# Patient Record
Sex: MALE | Race: WHITE | NOT HISPANIC OR LATINO | Employment: FULL TIME | URBAN - METROPOLITAN AREA
[De-identification: names, ages, dates, MRNs, and addresses within clinical notes are randomized per-mention and may not be internally consistent; named-entity substitution may affect disease eponyms.]

---

## 2019-10-09 ENCOUNTER — HOSPITAL ENCOUNTER (EMERGENCY)
Facility: HOSPITAL | Age: 34
Discharge: HOME | End: 2019-10-09
Attending: EMERGENCY MEDICINE | Admitting: EMERGENCY MEDICINE

## 2019-10-09 ENCOUNTER — APPOINTMENT (OUTPATIENT)
Dept: GENERAL RADIOLOGY | Facility: HOSPITAL | Age: 34
End: 2019-10-09

## 2019-10-09 VITALS
HEART RATE: 72 BPM | HEIGHT: 71 IN | WEIGHT: 159 LBS | SYSTOLIC BLOOD PRESSURE: 114 MMHG | BODY MASS INDEX: 22.26 KG/M2 | TEMPERATURE: 97.9 F | RESPIRATION RATE: 20 BRPM | OXYGEN SATURATION: 90 % | DIASTOLIC BLOOD PRESSURE: 64 MMHG

## 2019-10-09 DIAGNOSIS — R06.00 DYSPNEA, UNSPECIFIED TYPE: ICD-10-CM

## 2019-10-09 DIAGNOSIS — J44.1 COPD EXACERBATION (HCC): Primary | ICD-10-CM

## 2019-10-09 PROCEDURE — 99284 EMERGENCY DEPT VISIT MOD MDM: CPT

## 2019-10-09 PROCEDURE — 71045 X-RAY EXAM CHEST 1 VIEW: CPT

## 2019-10-09 PROCEDURE — 63710000001 PREDNISONE PER 5 MG: Performed by: EMERGENCY MEDICINE

## 2019-10-09 PROCEDURE — 94640 AIRWAY INHALATION TREATMENT: CPT

## 2019-10-09 RX ORDER — ALBUTEROL SULFATE 1.25 MG/3ML
1 SOLUTION RESPIRATORY (INHALATION) EVERY 4 HOURS PRN
Qty: 20 VIAL | Refills: 0 | Status: SHIPPED | OUTPATIENT
Start: 2019-10-09

## 2019-10-09 RX ORDER — IPRATROPIUM BROMIDE AND ALBUTEROL SULFATE 2.5; .5 MG/3ML; MG/3ML
3 SOLUTION RESPIRATORY (INHALATION) ONCE
Status: COMPLETED | OUTPATIENT
Start: 2019-10-09 | End: 2019-10-09

## 2019-10-09 RX ORDER — DOXYCYCLINE 100 MG/1
100 CAPSULE ORAL 2 TIMES DAILY
Qty: 14 CAPSULE | Refills: 0 | Status: SHIPPED | OUTPATIENT
Start: 2019-10-09

## 2019-10-09 RX ORDER — PREDNISONE 50 MG/1
50 TABLET ORAL DAILY
Qty: 4 TABLET | Refills: 0 | Status: SHIPPED | OUTPATIENT
Start: 2019-10-09

## 2019-10-09 RX ADMIN — IPRATROPIUM BROMIDE AND ALBUTEROL SULFATE 3 ML: .5; 3 SOLUTION RESPIRATORY (INHALATION) at 20:42

## 2019-10-09 RX ADMIN — PREDNISONE 60 MG: 10 TABLET ORAL at 20:41

## 2019-10-10 NOTE — ED PROVIDER NOTES
"Subjective   History of Present Illness  Shortness of breath and cough  34-year-old male with history of COPD complains of cough and shortness of breath over the last few hours.  He states he had a nonproductive cough he denies any known ill contacts he reports no fever chills or chest pain.  He denies any known triggers.  Review of Systems   Constitutional: Negative.    HENT: Negative.    Respiratory: Positive for cough, shortness of breath and wheezing.    Cardiovascular: Negative.    Gastrointestinal: Negative.    Musculoskeletal: Negative.    Neurological: Negative.        Past Medical History:   Diagnosis Date   • COPD (chronic obstructive pulmonary disease) (CMS/[facility])      COPD  No Known Allergies    History reviewed. No pertinent surgical history.    History reviewed. No pertinent family history.    Social History     Socioeconomic History   • Marital status:      Spouse name: Not on file   • Number of children: Not on file   • Years of education: Not on file   • Highest education level: Not on file   Tobacco Use   • Smoking status: Current Every Day Smoker     Packs/day: 1.50     Types: Cigarettes   • Smokeless tobacco: Never Used   Substance and Sexual Activity   • Alcohol use: No     Frequency: Never   • Drug use: No   • Sexual activity: Yes     Chronic smoker      Objective   Physical Exam  /72   Pulse 84   Temp 98.1 °F (36.7 °C)   Resp 18   Ht 180.3 cm (71\")   Wt 72.1 kg (159 lb)   SpO2 91%   BMI 22.18 kg/m²   General: Well-developed well-appearing, no acute distress, alert and appropriate  Eyes: Pupils round and reactive, sclera nonicteric  HEENT: Mucous membranes moist, no mucosal swelling  Neck: Supple, no nuchal rigidity, no lymphadenopathy  Respirations: Expiratory wheeze, respirations nonlabored, occasional bronc spastic cough  Heart regular rate and rhythm, no murmurs rubs or gallops,   Abdomen soft nontender nondistended, no hepatosplenomegaly, no hernia, no mass, normal bowel " sounds, no CVA tenderness  Extremities no clubbing cyanosis or edema, calves are symmetric and nontender  Neuro cranial nerves grossly intact, no focal limb deficits  Psych oriented, pleasant affect  Skin no rash, brisk cap refill  Procedures           ED Course          Xr Chest 1 View    Result Date: 10/9/2019  1.  No evidence of active disease.   Electronically Signed By-Anca Eddy On:10/9/2019 8:43 PM This report was finalized on [id number] by  Anca Eddy, .            MDM  Patient was ordered duo nebs and steroid.  He is resting capillary examination his oxygen saturation were in the mid 90s and he is in no respiratory distress.  He does present with findings of COPD exacerbation with acute bronchitis.  He states he is out of his nebulizer treatments.  He was prescribed albuterol nebulizer solution as well as prednisone and doxycycline.  He was given warning signs for return and was advised on smoking cessation  Final diagnoses:   COPD exacerbation (CMS/[facility])   Dyspnea, unspecified type              Bill Díaz MD  10/09/19 0953

## 2022-06-19 ENCOUNTER — HOSPITAL ENCOUNTER (EMERGENCY)
Facility: HOSPITAL | Age: 37
Discharge: HOME | End: 2022-06-19
Attending: EMERGENCY MEDICINE | Admitting: EMERGENCY MEDICINE

## 2022-06-19 ENCOUNTER — APPOINTMENT (OUTPATIENT)
Dept: GENERAL RADIOLOGY | Facility: HOSPITAL | Age: 37
End: 2022-06-19

## 2022-06-19 VITALS
RESPIRATION RATE: 15 BRPM | BODY MASS INDEX: 21.45 KG/M2 | HEIGHT: 71 IN | DIASTOLIC BLOOD PRESSURE: 63 MMHG | WEIGHT: 153.22 LBS | TEMPERATURE: 97.8 F | SYSTOLIC BLOOD PRESSURE: 105 MMHG | HEART RATE: 42 BPM | OXYGEN SATURATION: 96 %

## 2022-06-19 DIAGNOSIS — K08.89 PAIN, DENTAL: ICD-10-CM

## 2022-06-19 DIAGNOSIS — J02.9 PHARYNGITIS, UNSPECIFIED ETIOLOGY: Primary | ICD-10-CM

## 2022-06-19 LAB
B PARAPERT DNA SPEC QL NAA+PROBE: NOT DETECTED
B PERT DNA SPEC QL NAA+PROBE: NOT DETECTED
C PNEUM DNA NPH QL NAA+NON-PROBE: NOT DETECTED
FLUAV SUBTYP SPEC NAA+PROBE: NOT DETECTED
FLUBV RNA ISLT QL NAA+PROBE: NOT DETECTED
HADV DNA SPEC NAA+PROBE: NOT DETECTED
HCOV 229E RNA SPEC QL NAA+PROBE: NOT DETECTED
HCOV HKU1 RNA SPEC QL NAA+PROBE: NOT DETECTED
HCOV NL63 RNA SPEC QL NAA+PROBE: NOT DETECTED
HCOV OC43 RNA SPEC QL NAA+PROBE: NOT DETECTED
HMPV RNA NPH QL NAA+NON-PROBE: NOT DETECTED
HPIV1 RNA ISLT QL NAA+PROBE: NOT DETECTED
HPIV2 RNA SPEC QL NAA+PROBE: NOT DETECTED
HPIV3 RNA NPH QL NAA+PROBE: NOT DETECTED
HPIV4 P GENE NPH QL NAA+PROBE: NOT DETECTED
M PNEUMO IGG SER IA-ACNC: NOT DETECTED
RHINOVIRUS RNA SPEC NAA+PROBE: NOT DETECTED
RSV RNA NPH QL NAA+NON-PROBE: NOT DETECTED
S PYO AG THROAT QL: NEGATIVE
SARS-COV-2 RNA NPH QL NAA+NON-PROBE: NOT DETECTED

## 2022-06-19 PROCEDURE — 0202U NFCT DS 22 TRGT SARS-COV-2: CPT | Performed by: EMERGENCY MEDICINE

## 2022-06-19 PROCEDURE — 70360 X-RAY EXAM OF NECK: CPT

## 2022-06-19 PROCEDURE — C9803 HOPD COVID-19 SPEC COLLECT: HCPCS | Performed by: EMERGENCY MEDICINE

## 2022-06-19 PROCEDURE — 87651 STREP A DNA AMP PROBE: CPT | Performed by: EMERGENCY MEDICINE

## 2022-06-19 PROCEDURE — 99283 EMERGENCY DEPT VISIT LOW MDM: CPT

## 2022-06-19 RX ORDER — CLINDAMYCIN HYDROCHLORIDE 300 MG/1
300 CAPSULE ORAL 4 TIMES DAILY
Qty: 28 CAPSULE | Refills: 0 | Status: SHIPPED | OUTPATIENT
Start: 2022-06-19

## 2022-06-19 RX ADMIN — ALUMINA, MAGNESIA, AND SIMETHICONE: 2400; 2400; 240 SUSPENSION ORAL at 06:07

## 2022-06-19 NOTE — ED PROVIDER NOTES
Subjective   Chief complaint: Patient is a pleasant 37-year-old male.  He came in because of pain when he swallows.  It hurts on both sides of his neck.  His glands feel swollen.  He thinks could be his teeth.  He has bad teeth since he used to be a drug abuser.  He is clean now.  He has pain on the left side of his jaw.  But that pain has been there.  Its not been significantly worsening.  His daughter has had strep throat 3 times in the last few weeks.  He has not had fever.  No problems with secretions.  No vomiting.  No cough.  No chest pain.  No abdominal pain.    Context: As above    Duration: 3 days    Timing: Persistent    Severity: Moderate    Associated Symptoms: As noted above        PCP:  LMP:          Review of Systems   Constitutional: Negative for fever.   HENT: Positive for dental problem and sore throat.    Respiratory: Negative.    Cardiovascular: Negative.    Gastrointestinal: Negative.        Past Medical History:   Diagnosis Date   • COPD (chronic obstructive pulmonary disease) (CMS/[facility])        Allergies   Allergen Reactions   • Codeine Itching       No past surgical history on file.    No family history on file.    Social History     Socioeconomic History   • Marital status:    Tobacco Use   • Smoking status: Current Every Day Smoker     Packs/day: 1.50     Types: Cigarettes   • Smokeless tobacco: Never Used   Substance and Sexual Activity   • Alcohol use: No   • Drug use: No   • Sexual activity: Yes           Objective   Physical Exam  Vitals and nursing note reviewed.   HENT:      Head: Normocephalic and atraumatic.      Mouth/Throat:      Mouth: Mucous membranes are moist.      Comments: Patient with poor diffuse dentition.  The left lower jawline is tender to palpate.  No abscess is noted.  There is no drooling.  There is no hot potato voice.  Patient is not having any problems with airway and has not tripoding.  Eyes:      Pupils: Pupils are equal, round, and reactive to light.    Neck:        Comments: Mild tenderness to palpation anterior cervical node chain  Cardiovascular:      Rate and Rhythm: Normal rate and regular rhythm.      Heart sounds: Normal heart sounds.   Pulmonary:      Effort: Pulmonary effort is normal.      Breath sounds: Normal breath sounds.   Musculoskeletal:         General: Normal range of motion.   Neurological:      General: No focal deficit present.      Mental Status: He is oriented to person, place, and time.   Psychiatric:         Mood and Affect: Mood normal.         Behavior: Behavior normal.         Thought Content: Thought content normal.         Judgment: Judgment normal.         Procedures           ED Course            Results for orders placed or performed during the hospital encounter of 06/19/22   Respiratory Panel PCR w/COVID-19(SARS-CoV-2) FATOU/ASCENCION/PANKAJ/PAD/COR/MAD/[facility] In-House, NP Swab in UTM/VTM, 3-4 HR TAT - Swab, Nasopharynx    Specimen: Nasopharynx; Swab   Result Value Ref Range    ADENOVIRUS, PCR Not Detected Not Detected    Coronavirus 229E Not Detected Not Detected    Coronavirus HKU1 Not Detected Not Detected    Coronavirus NL63 Not Detected Not Detected    Coronavirus OC43 Not Detected Not Detected    COVID19 Not Detected Not Detected - Ref. Range    Human Metapneumovirus Not Detected Not Detected    Human Rhinovirus/Enterovirus Not Detected Not Detected    Influenza A PCR Not Detected Not Detected    Influenza B PCR Not Detected Not Detected    Parainfluenza Virus 1 Not Detected Not Detected    Parainfluenza Virus 2 Not Detected Not Detected    Parainfluenza Virus 3 Not Detected Not Detected    Parainfluenza Virus 4 Not Detected Not Detected    RSV, PCR Not Detected Not Detected    Bordetella pertussis pcr Not Detected Not Detected    Bordetella parapertussis PCR Not Detected Not Detected    Chlamydophila pneumoniae PCR Not Detected Not Detected    Mycoplasma pneumo by PCR Not Detected Not Detected   Rapid Strep A Screen - Swab, Throat     Specimen: Throat; Swab   Result Value Ref Range    Strep A Ag Negative Negative     XR Neck Soft Tissue    Result Date: 6/19/2022  1.  No acute abnormality. Electronically signed by:  Jasmin Kasper M.D.  6/19/2022 5:10 AM                                            MDM  Number of Diagnoses or Management Options  Diagnosis management comments: Patient remained stable here.  His blood pressure did come down.  He is sleeping currently.  His heart rate while he was asleep did transiently drop 46.  No underlying cardiac conditions.  Blood pressure stable.  This point time will discharge to treat his jaw tooth dental caries and sore throat with antibiotics.  He verbalized understanding is okay with this.       Amount and/or Complexity of Data Reviewed  Clinical lab tests: reviewed  Tests in the radiology section of CPT®: reviewed  Independent visualization of images, tracings, or specimens: yes    Patient Progress  Patient progress: stable      Final diagnoses:   None     Left-sided jaw pain  Pharyngitis  ED Disposition  ED Disposition     None          No follow-up provider specified.       Medication List      No changes were made to your prescriptions during this visit.          Remi Vernon DO  06/19/22 0800

## 2022-06-26 ENCOUNTER — HOSPITAL ENCOUNTER (EMERGENCY)
Facility: HOSPITAL | Age: 37
Discharge: LEFT BEFORE DISCHARGE | End: 2022-06-26

## 2022-06-26 VITALS
WEIGHT: 145 LBS | HEART RATE: 63 BPM | HEIGHT: 71 IN | RESPIRATION RATE: 16 BRPM | OXYGEN SATURATION: 97 % | TEMPERATURE: 97.5 F | BODY MASS INDEX: 20.3 KG/M2

## 2022-06-26 PROCEDURE — 99211 OFF/OP EST MAY X REQ PHY/QHP: CPT

## 2022-10-04 ENCOUNTER — HOSPITAL ENCOUNTER (EMERGENCY)
Facility: HOSPITAL | Age: 37
Discharge: HOME | End: 2022-10-04
Attending: EMERGENCY MEDICINE | Admitting: EMERGENCY MEDICINE

## 2022-10-04 ENCOUNTER — APPOINTMENT (OUTPATIENT)
Dept: GENERAL RADIOLOGY | Facility: HOSPITAL | Age: 37
End: 2022-10-04

## 2022-10-04 VITALS
BODY MASS INDEX: 20.3 KG/M2 | WEIGHT: 145 LBS | OXYGEN SATURATION: 98 % | DIASTOLIC BLOOD PRESSURE: 82 MMHG | HEART RATE: 62 BPM | SYSTOLIC BLOOD PRESSURE: 104 MMHG | RESPIRATION RATE: 20 BRPM | HEIGHT: 71 IN | TEMPERATURE: 98.2 F

## 2022-10-04 DIAGNOSIS — M25.532 LEFT WRIST PAIN: Primary | ICD-10-CM

## 2022-10-04 DIAGNOSIS — T14.8XXA SKIN FOREIGN BODY: ICD-10-CM

## 2022-10-04 PROCEDURE — 90715 TDAP VACCINE 7 YRS/> IM: CPT | Performed by: NURSE PRACTITIONER

## 2022-10-04 PROCEDURE — 73110 X-RAY EXAM OF WRIST: CPT

## 2022-10-04 PROCEDURE — 90471 IMMUNIZATION ADMIN: CPT | Performed by: NURSE PRACTITIONER

## 2022-10-04 PROCEDURE — 99283 EMERGENCY DEPT VISIT LOW MDM: CPT

## 2022-10-04 PROCEDURE — 25010000002 TETANUS-DIPHTH-ACELL PERTUSSIS 5-2.5-18.5 LF-MCG/0.5 SUSPENSION PREFILLED SYRINGE: Performed by: NURSE PRACTITIONER

## 2022-10-04 RX ADMIN — TETANUS TOXOID, REDUCED DIPHTHERIA TOXOID AND ACELLULAR PERTUSSIS VACCINE, ADSORBED 0.5 ML: 5; 2.5; 8; 8; 2.5 SUSPENSION INTRAMUSCULAR at 17:05

## 2022-10-04 NOTE — DISCHARGE INSTRUCTIONS
There may still be a small piece of splinter in your left wrist watch for signs infection or worsening symptoms follow-up with primary care or one of the orthopedic doctors listed above.    Your x-ray did not show any foreign body today.  But sometimes would is hard to identify via x-ray    Use Tylenol Motrin for disc    Return if worse

## 2022-10-04 NOTE — ED NOTES
Patient got a splinter in his left wrist. Patient thinks a piece of splinter might still be in wrist

## 2022-10-04 NOTE — ED PROVIDER NOTES
Subjective   History of Present Illness  Patient is a 37-year-old gentleman who was at work today he works at a [employer] company he states that he got a splinter in his left wrist and he is afraid there is still some and there-he is complaining of some mild discomfort he states it is worse when he moves his left thumb.-He denies any numbness or tingling.  He rates his pain a 6/10 he states it is worse with movement it does radiate from the wrist into the left thumb.        Review of Systems   Constitutional: Negative for chills, fatigue and fever.   HENT: Negative for congestion, tinnitus and trouble swallowing.    Eyes: Negative for photophobia, discharge and redness.   Respiratory: Negative for cough and shortness of breath.    Cardiovascular: Negative for chest pain and palpitations.   Gastrointestinal: Negative for abdominal pain, diarrhea, nausea and vomiting.   Genitourinary: Negative for dysuria, frequency and urgency.   Musculoskeletal: Negative for back pain, joint swelling and myalgias.        Left wrist left   Skin: Negative for rash.   Neurological: Negative for dizziness and headaches.   Psychiatric/Behavioral: Negative for confusion.   All other systems reviewed and are negative.      Past Medical History:   Diagnosis Date   • COPD (chronic obstructive pulmonary disease) ([facility])        Allergies   Allergen Reactions   • Codeine Itching       History reviewed. No pertinent surgical history.    History reviewed. No pertinent family history.    Social History     Socioeconomic History   • Marital status:    Tobacco Use   • Smoking status: Current Every Day Smoker     Packs/day: 1.50     Types: Cigarettes   • Smokeless tobacco: Never Used   Substance and Sexual Activity   • Alcohol use: No   • Drug use: No   • Sexual activity: Yes           Objective   Physical Exam  Vitals reviewed.   Constitutional:       Appearance: He is well-developed.   HENT:      Head: Normocephalic and atraumatic.   Eyes:       "Conjunctiva/sclera: Conjunctivae normal.      Pupils: Pupils are equal, round, and reactive to light.   Cardiovascular:      Rate and Rhythm: Normal rate and regular rhythm.      Heart sounds: Normal heart sounds.   Pulmonary:      Effort: Pulmonary effort is normal.      Breath sounds: Normal breath sounds.   Musculoskeletal:         General: Normal range of motion.      Right wrist: Normal.      Left wrist: Tenderness present.      Cervical back: Normal range of motion and neck supple.   Skin:     General: Skin is warm and dry.      Capillary Refill: Capillary refill takes less than 2 seconds.          Neurological:      General: No focal deficit present.      Mental Status: He is alert and oriented to person, place, and time.      GCS: GCS eye subscore is 4. GCS verbal subscore is 5. GCS motor subscore is 6.   Psychiatric:         Mood and Affect: Mood normal.         Behavior: Behavior normal.         Procedures       The area was anesthetized with 1% lidocaine with epinephrine and an 18-gauge needle was used to unroofed the insertion site and no foreign body was identified I do not palpate any foreign body at this time.  Wound was extensively cleaned bacitracin and dressing was applied the patient tolerated the procedure well    ED Course      /82 (BP Location: Left arm, Patient Position: Sitting)   Pulse 62   Temp 98.2 °F (36.8 °C) (Temporal)   Resp 20   Ht 180.3 cm (71\")   Wt 65.8 kg (145 lb)   SpO2 98%   BMI 20.22 kg/m²   Labs Reviewed - No data to display  Medications   Tetanus-Diphth-Acell Pertussis (BOOSTRIX) injection 0.5 mL (0.5 mL Intramuscular Given 10/4/22 1705)                                            MDM  Number of Diagnoses or Management Options  Left wrist pain  Diagnosis management comments: Patient had above exam and procedure as documented.  The x-ray does not show a definitive foreign body.  I spoke to the patient about this.  We talked about following up with hand if pain " persisted watch for signs of infection keep it clean dry and covered the patient verbalized understood the need to return he was given a Tdap here in the emergency room as well    Risk of Complications, Morbidity, and/or Mortality  Presenting problems: high  Diagnostic procedures: high  Management options: high    Patient Progress  Patient progress: improved      Final diagnoses:   Left wrist pain   Skin foreign body       ED Disposition  ED Disposition     ED Disposition   Discharge    Condition   Stable    Comment   --             Brady Duran MD  [address]  [place] [address]  [phone]    In 3 days  If symptoms worsen, As needed    Jonatan Sheikh II, MD  [address] [place] [address]  [phone]    In 5 days  If symptoms worsen, As needed    Bharathi Rollins MD  [address] [facility] [facility] [address]  [place] [address]  [phone]    In 3 days  If symptoms worsen, As needed         Medication List      No changes were made to your prescriptions during this visit.          Meredith Virgen, APRN  10/04/22 1750

## 2022-10-18 ENCOUNTER — LAB (OUTPATIENT)
Dept: LAB | Facility: HOSPITAL | Age: 37
End: 2022-10-18

## 2022-10-18 ENCOUNTER — HOSPITAL ENCOUNTER (OUTPATIENT)
Dept: GENERAL RADIOLOGY | Facility: HOSPITAL | Age: 37
Discharge: HOME | End: 2022-10-18

## 2022-10-18 ENCOUNTER — HOSPITAL ENCOUNTER (OUTPATIENT)
Dept: CARDIOLOGY | Facility: HOSPITAL | Age: 37
Discharge: HOME | End: 2022-10-18

## 2022-10-18 ENCOUNTER — TRANSCRIBE ORDERS (OUTPATIENT)
Dept: ADMINISTRATIVE | Facility: HOSPITAL | Age: 37
End: 2022-10-18

## 2022-10-18 DIAGNOSIS — Z01.818 PREOP TESTING: ICD-10-CM

## 2022-10-18 DIAGNOSIS — Z01.818 PREOP TESTING: Primary | ICD-10-CM

## 2022-10-18 LAB
ALBUMIN SERPL-MCNC: 4.7 G/DL (ref 3.5–5.2)
ALBUMIN/GLOB SERPL: 1.9 G/DL
ALP SERPL-CCNC: 57 U/L (ref 39–117)
ALT SERPL W P-5'-P-CCNC: 18 U/L (ref 1–41)
ANION GAP SERPL CALCULATED.3IONS-SCNC: 8.6 MMOL/L (ref 5–15)
AST SERPL-CCNC: 28 U/L (ref 1–40)
BASOPHILS # BLD AUTO: 0.07 10*3/MM3 (ref 0–0.2)
BASOPHILS NFR BLD AUTO: 1 % (ref 0–1.5)
BILIRUB SERPL-MCNC: 0.2 MG/DL (ref 0–1.2)
BILIRUB UR QL STRIP: NEGATIVE
BUN SERPL-MCNC: 9 MG/DL (ref 6–20)
BUN/CREAT SERPL: 11.1 (ref 7–25)
CALCIUM SPEC-SCNC: 9.2 MG/DL (ref 8.6–10.5)
CHLORIDE SERPL-SCNC: 103 MMOL/L (ref 98–107)
CLARITY UR: CLEAR
CO2 SERPL-SCNC: 27.4 MMOL/L (ref 22–29)
COLOR UR: YELLOW
CREAT SERPL-MCNC: 0.81 MG/DL (ref 0.76–1.27)
DEPRECATED RDW RBC AUTO: 36.7 FL (ref 37–54)
EGFRCR SERPLBLD CKD-EPI 2021: 117.2 ML/MIN/1.73
EOSINOPHIL # BLD AUTO: 0.17 10*3/MM3 (ref 0–0.4)
EOSINOPHIL NFR BLD AUTO: 2.5 % (ref 0.3–6.2)
ERYTHROCYTE [DISTWIDTH] IN BLOOD BY AUTOMATED COUNT: 11.4 % (ref 12.3–15.4)
GLOBULIN UR ELPH-MCNC: 2.5 GM/DL
GLUCOSE SERPL-MCNC: 92 MG/DL (ref 65–99)
GLUCOSE UR STRIP-MCNC: NEGATIVE MG/DL
HCT VFR BLD AUTO: 35.5 % (ref 37.5–51)
HGB BLD-MCNC: 12.5 G/DL (ref 13–17.7)
HGB UR QL STRIP.AUTO: NEGATIVE
IMM GRANULOCYTES # BLD AUTO: 0.01 10*3/MM3 (ref 0–0.05)
IMM GRANULOCYTES NFR BLD AUTO: 0.1 % (ref 0–0.5)
KETONES UR QL STRIP: NEGATIVE
LEUKOCYTE ESTERASE UR QL STRIP.AUTO: NEGATIVE
LYMPHOCYTES # BLD AUTO: 2.36 10*3/MM3 (ref 0.7–3.1)
LYMPHOCYTES NFR BLD AUTO: 34.8 % (ref 19.6–45.3)
MCH RBC QN AUTO: 30.9 PG (ref 26.6–33)
MCHC RBC AUTO-ENTMCNC: 35.2 G/DL (ref 31.5–35.7)
MCV RBC AUTO: 87.9 FL (ref 79–97)
MONOCYTES # BLD AUTO: 0.56 10*3/MM3 (ref 0.1–0.9)
MONOCYTES NFR BLD AUTO: 8.3 % (ref 5–12)
NEUTROPHILS NFR BLD AUTO: 3.61 10*3/MM3 (ref 1.7–7)
NEUTROPHILS NFR BLD AUTO: 53.3 % (ref 42.7–76)
NITRITE UR QL STRIP: NEGATIVE
NRBC BLD AUTO-RTO: 0 /100 WBC (ref 0–0.2)
PH UR STRIP.AUTO: 7.5 [PH] (ref 5–8)
PLATELET # BLD AUTO: 258 10*3/MM3 (ref 140–450)
PMV BLD AUTO: 10.9 FL (ref 6–12)
POTASSIUM SERPL-SCNC: 4.2 MMOL/L (ref 3.5–5.2)
PROT SERPL-MCNC: 7.2 G/DL (ref 6–8.5)
PROT UR QL STRIP: NEGATIVE
RBC # BLD AUTO: 4.04 10*6/MM3 (ref 4.14–5.8)
SODIUM SERPL-SCNC: 139 MMOL/L (ref 136–145)
SP GR UR STRIP: 1.01 (ref 1–1.03)
UROBILINOGEN UR QL STRIP: NORMAL
WBC NRBC COR # BLD: 6.78 10*3/MM3 (ref 3.4–10.8)

## 2022-10-18 PROCEDURE — 85025 COMPLETE CBC W/AUTO DIFF WBC: CPT

## 2022-10-18 PROCEDURE — 80053 COMPREHEN METABOLIC PANEL: CPT

## 2022-10-18 PROCEDURE — 71046 X-RAY EXAM CHEST 2 VIEWS: CPT

## 2022-10-18 PROCEDURE — 93005 ELECTROCARDIOGRAM TRACING: CPT | Performed by: STUDENT IN AN ORGANIZED HEALTH CARE EDUCATION/TRAINING PROGRAM

## 2022-10-18 PROCEDURE — 93010 ELECTROCARDIOGRAM REPORT: CPT | Performed by: INTERNAL MEDICINE

## 2022-10-18 PROCEDURE — 81003 URINALYSIS AUTO W/O SCOPE: CPT

## 2022-10-18 PROCEDURE — 36415 COLL VENOUS BLD VENIPUNCTURE: CPT

## 2022-10-26 LAB — QT INTERVAL: 412 MS

## 2023-03-18 ENCOUNTER — APPOINTMENT (OUTPATIENT)
Dept: GENERAL RADIOLOGY | Facility: HOSPITAL | Age: 38
End: 2023-03-18
Payer: MEDICAID

## 2023-03-18 ENCOUNTER — HOSPITAL ENCOUNTER (EMERGENCY)
Facility: HOSPITAL | Age: 38
Discharge: HOME | End: 2023-03-18
Attending: EMERGENCY MEDICINE | Admitting: EMERGENCY MEDICINE
Payer: MEDICAID

## 2023-03-18 VITALS
SYSTOLIC BLOOD PRESSURE: 123 MMHG | DIASTOLIC BLOOD PRESSURE: 98 MMHG | OXYGEN SATURATION: 96 % | HEART RATE: 51 BPM | TEMPERATURE: 97.7 F | WEIGHT: 163.58 LBS | HEIGHT: 71 IN | RESPIRATION RATE: 16 BRPM | BODY MASS INDEX: 22.9 KG/M2

## 2023-03-18 DIAGNOSIS — R07.89 CHEST WALL PAIN: Primary | ICD-10-CM

## 2023-03-18 LAB — TROPONIN T SERPL HS-MCNC: 6 NG/L

## 2023-03-18 PROCEDURE — 99283 EMERGENCY DEPT VISIT LOW MDM: CPT

## 2023-03-18 PROCEDURE — 93005 ELECTROCARDIOGRAM TRACING: CPT | Performed by: NURSE PRACTITIONER

## 2023-03-18 PROCEDURE — 71046 X-RAY EXAM CHEST 2 VIEWS: CPT

## 2023-03-18 PROCEDURE — 84484 ASSAY OF TROPONIN QUANT: CPT | Performed by: NURSE PRACTITIONER

## 2023-03-18 PROCEDURE — 36415 COLL VENOUS BLD VENIPUNCTURE: CPT

## 2023-03-18 RX ORDER — IBUPROFEN 400 MG/1
800 TABLET ORAL ONCE
Status: COMPLETED | OUTPATIENT
Start: 2023-03-18 | End: 2023-03-18

## 2023-03-18 RX ORDER — CYCLOBENZAPRINE HCL 10 MG
10 TABLET ORAL 3 TIMES DAILY PRN
Qty: 15 TABLET | Refills: 0 | Status: SHIPPED | OUTPATIENT
Start: 2023-03-18

## 2023-03-18 RX ORDER — IBUPROFEN 800 MG/1
800 TABLET ORAL EVERY 8 HOURS PRN
Qty: 15 TABLET | Refills: 0 | Status: SHIPPED | OUTPATIENT
Start: 2023-03-18 | End: 2023-03-18 | Stop reason: SDUPTHER

## 2023-03-18 RX ORDER — CYCLOBENZAPRINE HCL 10 MG
10 TABLET ORAL ONCE
Status: COMPLETED | OUTPATIENT
Start: 2023-03-18 | End: 2023-03-18

## 2023-03-18 RX ORDER — CYCLOBENZAPRINE HCL 10 MG
10 TABLET ORAL 3 TIMES DAILY PRN
Qty: 15 TABLET | Refills: 0 | Status: SHIPPED | OUTPATIENT
Start: 2023-03-18 | End: 2023-03-18 | Stop reason: SDUPTHER

## 2023-03-18 RX ORDER — IBUPROFEN 800 MG/1
800 TABLET ORAL EVERY 8 HOURS PRN
Qty: 15 TABLET | Refills: 0 | Status: SHIPPED | OUTPATIENT
Start: 2023-03-18

## 2023-03-18 RX ADMIN — IBUPROFEN 800 MG: 400 TABLET ORAL at 19:50

## 2023-03-18 RX ADMIN — CYCLOBENZAPRINE 10 MG: 10 TABLET, FILM COATED ORAL at 19:50

## 2023-03-18 NOTE — ED PROVIDER NOTES
Subjective   History of Present Illness  Patient is a 37-year-old white male with history of COPD who presents today for complaints of pain to the left side of his chest.  He states 2 days ago he was playing football with his son when he tackled him and his son kneed him in the chest.  States has had pain to the left side of the chest since then.  States pain radiates into his left upper back.  He denies any pain at rest but complains of pain with inspiration or with certain movements.  States he does have some discomfort and a numb sensation in his left arm when he moves it as well.  Denies any weakness in the arm.  He denies shortness of breath.  He does report pain with cough or deep breath.  He denies any significant cough or fever.  Denies any other chest pain.        Review of Systems   Constitutional: Negative for fever.   Respiratory: Negative for cough and shortness of breath.    Musculoskeletal:        Left-sided chest wall pain   Neurological: Negative for weakness and numbness.       Past Medical History:   Diagnosis Date   • COPD (chronic obstructive pulmonary disease) (HCC)        Allergies   Allergen Reactions   • Codeine Itching and Other (See Comments)     Throat itches       No past surgical history on file.    No family history on file.    Social History     Socioeconomic History   • Marital status:    Tobacco Use   • Smoking status: Every Day     Packs/day: 1.50     Types: Cigarettes   • Smokeless tobacco: Never   Substance and Sexual Activity   • Alcohol use: No   • Drug use: No   • Sexual activity: Yes           Objective   Physical Exam  Vital signs and triage nurse note reviewed.  Constitutional: Awake, alert; well-developed and well-nourished. No acute distress is noted.  HEENT: Normocephalic, atraumatic; pupils are PERRL with intact EOM; oropharynx is pink and moist without exudate or erythema.  No drooling or pooling of oral secretions.  Neck: Supple, full range of motion without  pain; no cervical lymphadenopathy. Normal phonation.  Cardiovascular: Regular rate and rhythm, normal S1-S2.  No murmur noted.  Pulmonary: Respiratory effort regular nonlabored, breath sounds clear to auscultation all fields.  There is no tenderness over the left chest wall however there is pain reproduced when patient raises his left arm or takes a deep breath.  There is no crepitus or subcu emphysema noted.  No rash.  Abdomen: Soft, nontender, nondistended with normoactive bowel sounds; no rebound or guarding.  Musculoskeletal: Independent range of motion of all extremities with no palpable tenderness or edema.  Neuro: Alert oriented x3, speech is clear and appropriate, GCS 15.    Skin: Flesh tone, warm, dry, intact; no erythematous or petechial rash or lesion.      Procedures           ED Course      Labs Reviewed   SINGLE HSTROPONIN T - Normal    Narrative:     High Sensitive Troponin T Reference Range:  <10.0 ng/L- Negative Female for AMI  <15.0 ng/L- Negative Male for AMI  >=10 - Abnormal Female indicating possible myocardial injury.  >=15 - Abnormal Male indicating possible myocardial injury.   Clinicians would have to utilize clinical acumen, EKG, Troponin, and serial changes to determine if it is an Acute Myocardial Infarction or myocardial injury due to an underlying chronic condition.          XR Chest 2 View    Result Date: 3/18/2023  Impression: No acute process. Electronically Signed: Issa Hargrove  3/18/2023 8:08 PM EDT  Workstation ID: [id number]    Medications   ibuprofen (ADVIL,MOTRIN) tablet 800 mg (800 mg Oral Given 3/18/23 1950)   cyclobenzaprine (FLEXERIL) tablet 10 mg (10 mg Oral Given 3/18/23 1950)                                          Medical Decision Making  Patient presents today with complaints of left-sided chest wall pain that started 2 days ago after he was kneed in the chest by his son while playing football.  Denies any pain at rest but complains of pain with inspiration, when he  coughs or when he raises his left arm.  The pain radiates into his back and he also has some discomfort down into his left arm.  Denies any shortness of breath fever or cough.    He had above exam evaluation.  Labs, EKG and x-ray were obtained.  He was given ibuprofen and Flexeril.    Work-up: High-sensitivity troponin negative.  EKG reviewed and interpreted by myself corroborated by Dr. Bangura shows sinus bradycardia with ventricular rate of 53, no acute ST or T wave changes.  My x-ray interpretation shows no acute abnormality, this was corroborated by the radiologist.    On reexamination patient resting quietly.  He is sleeping but easily arousable.  Denies any new symptoms at this time.  He is well-appearing and hemodynamically stable.    Findings were discussed with him.    Diagnosis and treatment plan discussed with patient.  Patient agreeable to plan.   I discussed findings with patient who voices understanding of discharge instructions, signs and symptoms requiring return to ED; discharged improved and in stable condition with follow up for re-evaluation.  Prescriptions for ibuprofen and Flexeril.    Amount and/or Complexity of Data Reviewed  Labs: ordered.  Radiology: ordered.  ECG/medicine tests: ordered.      Risk  Prescription drug management.          Final diagnoses:   Chest wall pain       ED Disposition  ED Disposition     ED Disposition   Discharge    Condition   Stable    Comment   --             [facility] - [place] [address]  [phone]             Medication List      New Prescriptions    cyclobenzaprine 10 MG tablet  Commonly known as: FLEXERIL  Take 1 tablet by mouth 3 (Three) Times a Day As Needed for Muscle Spasms for up to 15 doses.     ibuprofen 800 MG tablet  Commonly known as: ADVIL,MOTRIN  Take 1 tablet by mouth Every 8 (Eight) Hours As Needed for Moderate Pain.           Where to Get Your Medications      These medications were sent to [id number]  - [place], [place] - [address] [facility] AT [facility] [address] - [phone]  - [phone] FX  [address], [place] [address]    Phone: [phone]   · cyclobenzaprine 10 MG tablet  · ibuprofen 800 MG tablet          Pebbles Mujica, SHENA  03/18/23 6180

## 2023-03-19 ENCOUNTER — HOSPITAL ENCOUNTER (EMERGENCY)
Facility: HOSPITAL | Age: 38
Discharge: HOME | End: 2023-03-19
Attending: EMERGENCY MEDICINE | Admitting: EMERGENCY MEDICINE
Payer: MEDICAID

## 2023-03-19 ENCOUNTER — APPOINTMENT (OUTPATIENT)
Dept: CT IMAGING | Facility: HOSPITAL | Age: 38
End: 2023-03-19
Payer: MEDICAID

## 2023-03-19 VITALS
BODY MASS INDEX: 22.84 KG/M2 | OXYGEN SATURATION: 96 % | WEIGHT: 163.14 LBS | RESPIRATION RATE: 16 BRPM | TEMPERATURE: 98.3 F | SYSTOLIC BLOOD PRESSURE: 138 MMHG | HEIGHT: 71 IN | DIASTOLIC BLOOD PRESSURE: 95 MMHG | HEART RATE: 46 BPM

## 2023-03-19 DIAGNOSIS — R07.89 CHEST WALL PAIN: Primary | ICD-10-CM

## 2023-03-19 DIAGNOSIS — R91.8 PULMONARY NODULES: ICD-10-CM

## 2023-03-19 LAB
ANION GAP SERPL CALCULATED.3IONS-SCNC: 7 MMOL/L (ref 5–15)
BASOPHILS # BLD AUTO: 0 10*3/MM3 (ref 0–0.2)
BASOPHILS NFR BLD AUTO: 0.7 % (ref 0–1.5)
BUN SERPL-MCNC: 17 MG/DL (ref 6–20)
BUN/CREAT SERPL: 23.6 (ref 7–25)
CALCIUM SPEC-SCNC: 8.6 MG/DL (ref 8.6–10.5)
CHLORIDE SERPL-SCNC: 106 MMOL/L (ref 98–107)
CO2 SERPL-SCNC: 27 MMOL/L (ref 22–29)
CREAT SERPL-MCNC: 0.72 MG/DL (ref 0.76–1.27)
D DIMER PPP FEU-MCNC: <0.19 MG/L (FEU) (ref 0–0.5)
DEPRECATED RDW RBC AUTO: 42 FL (ref 37–54)
EGFRCR SERPLBLD CKD-EPI 2021: 121.4 ML/MIN/1.73
EOSINOPHIL # BLD AUTO: 0.2 10*3/MM3 (ref 0–0.4)
EOSINOPHIL NFR BLD AUTO: 4.5 % (ref 0.3–6.2)
ERYTHROCYTE [DISTWIDTH] IN BLOOD BY AUTOMATED COUNT: 12.7 % (ref 12.3–15.4)
GLUCOSE SERPL-MCNC: 103 MG/DL (ref 65–99)
HCT VFR BLD AUTO: 35 % (ref 37.5–51)
HGB BLD-MCNC: 12 G/DL (ref 13–17.7)
LYMPHOCYTES # BLD AUTO: 1.5 10*3/MM3 (ref 0.7–3.1)
LYMPHOCYTES NFR BLD AUTO: 31.8 % (ref 19.6–45.3)
MCH RBC QN AUTO: 31.1 PG (ref 26.6–33)
MCHC RBC AUTO-ENTMCNC: 34.4 G/DL (ref 31.5–35.7)
MCV RBC AUTO: 90.6 FL (ref 79–97)
MONOCYTES # BLD AUTO: 0.5 10*3/MM3 (ref 0.1–0.9)
MONOCYTES NFR BLD AUTO: 9.5 % (ref 5–12)
NEUTROPHILS NFR BLD AUTO: 2.5 10*3/MM3 (ref 1.7–7)
NEUTROPHILS NFR BLD AUTO: 53.5 % (ref 42.7–76)
NRBC BLD AUTO-RTO: 0.1 /100 WBC (ref 0–0.2)
PLATELET # BLD AUTO: 180 10*3/MM3 (ref 140–450)
PMV BLD AUTO: 8.5 FL (ref 6–12)
POTASSIUM SERPL-SCNC: 4.3 MMOL/L (ref 3.5–5.2)
QT INTERVAL: 446 MS
RBC # BLD AUTO: 3.87 10*6/MM3 (ref 4.14–5.8)
SODIUM SERPL-SCNC: 140 MMOL/L (ref 136–145)
TROPONIN T SERPL HS-MCNC: 6 NG/L
WBC NRBC COR # BLD: 4.8 10*3/MM3 (ref 3.4–10.8)

## 2023-03-19 PROCEDURE — 85379 FIBRIN DEGRADATION QUANT: CPT | Performed by: EMERGENCY MEDICINE

## 2023-03-19 PROCEDURE — 93005 ELECTROCARDIOGRAM TRACING: CPT | Performed by: EMERGENCY MEDICINE

## 2023-03-19 PROCEDURE — 99283 EMERGENCY DEPT VISIT LOW MDM: CPT

## 2023-03-19 PROCEDURE — 80048 BASIC METABOLIC PNL TOTAL CA: CPT | Performed by: EMERGENCY MEDICINE

## 2023-03-19 PROCEDURE — 71250 CT THORAX DX C-: CPT

## 2023-03-19 PROCEDURE — 85025 COMPLETE CBC W/AUTO DIFF WBC: CPT | Performed by: EMERGENCY MEDICINE

## 2023-03-19 PROCEDURE — 96374 THER/PROPH/DIAG INJ IV PUSH: CPT

## 2023-03-19 PROCEDURE — 84484 ASSAY OF TROPONIN QUANT: CPT | Performed by: EMERGENCY MEDICINE

## 2023-03-19 PROCEDURE — 25010000002 KETOROLAC TROMETHAMINE PER 15 MG: Performed by: EMERGENCY MEDICINE

## 2023-03-19 RX ORDER — KETOROLAC TROMETHAMINE 30 MG/ML
30 INJECTION, SOLUTION INTRAMUSCULAR; INTRAVENOUS ONCE
Status: COMPLETED | OUTPATIENT
Start: 2023-03-19 | End: 2023-03-19

## 2023-03-19 RX ADMIN — KETOROLAC TROMETHAMINE 30 MG: 30 INJECTION, SOLUTION INTRAMUSCULAR at 06:52

## 2023-03-19 NOTE — DISCHARGE INSTRUCTIONS
Take medication as prescribed.  Rest.  Avoid heavy lifting pulling or straining for the next 3 to 5 days.  Follow-up with primary care provider.  Return for new or worsening symptoms.

## 2023-03-19 NOTE — ED PROVIDER NOTES
Subjective   History of Present Illness  37-year-old male was seen here last evening for left-sided chest pain that appeared to be musculoskeletal in origin.  Patient had a negative chest x-ray and a negative troponin.  Patient tells me he was playing football with his son and was kneed in the chest.  Patient had some pain at that time then had worse pain in the morning.  Patient states his pain recurred this morning in the left chest worse with cough and breathing and movement.  Patient denies any history of DVT or PE, no fever, no dyspnea otherwise, no recent trips or travel, no calf pain or swelling, no recent surgeries.        Review of Systems   Respiratory: Positive for cough.    Cardiovascular: Positive for chest pain.   Musculoskeletal: Positive for back pain.   All other systems reviewed and are negative.      Past Medical History:   Diagnosis Date   • COPD (chronic obstructive pulmonary disease) ([facility])        Allergies   Allergen Reactions   • Codeine Itching and Other (See Comments)     Throat itches       No past surgical history on file.    No family history on file.    Social History     Socioeconomic History   • Marital status:    Tobacco Use   • Smoking status: Every Day     Packs/day: 1.50     Types: Cigarettes   • Smokeless tobacco: Never   Substance and Sexual Activity   • Alcohol use: No   • Drug use: No   • Sexual activity: Yes           Objective   Physical Exam  Constitutional:       Appearance: Normal appearance.   HENT:      Head: Normocephalic and atraumatic.      Mouth/Throat:      Mouth: Mucous membranes are moist.      Pharynx: Oropharynx is clear.   Eyes:      Conjunctiva/sclera: Conjunctivae normal.      Pupils: Pupils are equal, round, and reactive to light.   Cardiovascular:      Rate and Rhythm: Normal rate and regular rhythm.      Heart sounds: Normal heart sounds.   Pulmonary:      Effort: Pulmonary effort is normal.      Comments: Left chest wall normal inspection,  palpation and movement does reproduce pain at bedside.  Abdominal:      General: Bowel sounds are normal. There is no distension.      Palpations: Abdomen is soft.      Tenderness: There is no abdominal tenderness.      Comments: No pain with deep palpation left upper quadrant.   Musculoskeletal:         General: No swelling or tenderness. Normal range of motion.   Skin:     General: Skin is warm and dry.      Capillary Refill: Capillary refill takes less than 2 seconds.   Neurological:      General: No focal deficit present.      Mental Status: He is alert and oriented to person, place, and time.   Psychiatric:         Mood and Affect: Mood normal.         Behavior: Behavior normal.         Procedures           ED Course                                           Medical Decision Making  Patient resting comfortably, clinically with chest wall pain, no acute findings on imaging no I did impress upon the patient the critical necessity to follow-up on these lung nodules in the setting of his tobacco abuse.  Patient will be referred to pulmonology as well as a primary care physician.  I did state that he needed repeat imaging in 3 to 6 months.  Return precautions reviewed.    Chest wall pain: acute illness or injury     Details: As above  Pulmonary nodules: acute illness or injury     Details: As above  Amount and/or Complexity of Data Reviewed  Labs: ordered.     Details: Negative dimer/neg troponin  Radiology: ordered.  ECG/medicine tests: ordered.     Details: EKG interpretation: Bradycardia, rate 56, early repole, no STEMI seen      Risk  Prescription drug management.          Final diagnoses:   Chest wall pain   Pulmonary nodules       ED Disposition  ED Disposition     ED Disposition   Discharge    Condition   Stable    Comment   --             Nicholas Ruth MD  [address] [place] [address] [phone]    Schedule an appointment as soon as possible for a visit       PATIENT CONNECTION - [facility]  [place] [address]  [phone]  Schedule an appointment as soon as possible for a visit            Medication List      No changes were made to your prescriptions during this visit.          Romel Mcgowan MD  03/19/23 0847

## 2023-03-23 LAB — QT INTERVAL: 442 MS

## 2023-05-21 ENCOUNTER — APPOINTMENT (OUTPATIENT)
Dept: GENERAL RADIOLOGY | Facility: HOSPITAL | Age: 38
End: 2023-05-21
Payer: MEDICAID

## 2023-05-21 ENCOUNTER — HOSPITAL ENCOUNTER (EMERGENCY)
Facility: HOSPITAL | Age: 38
Discharge: HOME | End: 2023-05-21
Attending: EMERGENCY MEDICINE | Admitting: EMERGENCY MEDICINE
Payer: MEDICAID

## 2023-05-21 VITALS
SYSTOLIC BLOOD PRESSURE: 122 MMHG | HEIGHT: 71 IN | DIASTOLIC BLOOD PRESSURE: 66 MMHG | HEART RATE: 59 BPM | TEMPERATURE: 97.8 F | OXYGEN SATURATION: 97 % | RESPIRATION RATE: 20 BRPM | WEIGHT: 159.61 LBS | BODY MASS INDEX: 22.35 KG/M2

## 2023-05-21 DIAGNOSIS — S93.492A SPRAIN OF ANTERIOR TALOFIBULAR LIGAMENT OF LEFT ANKLE, INITIAL ENCOUNTER: Primary | ICD-10-CM

## 2023-05-21 PROCEDURE — 99283 EMERGENCY DEPT VISIT LOW MDM: CPT

## 2023-05-21 PROCEDURE — 73610 X-RAY EXAM OF ANKLE: CPT

## 2023-05-21 NOTE — ED PROVIDER NOTES
"Subjective   History of Present Illness  37-year-old male describes an inversion mechanism ankle twisting while playing basketball yesterday evening.  States he has some pain in the lateral aspect the left ankle when he bears weight.  He reports no other injury and no numbness or weakness in extremity  Review of Systems    Past Medical History:   Diagnosis Date   • COPD (chronic obstructive pulmonary disease)        Allergies   Allergen Reactions   • Codeine Itching and Other (See Comments)     Throat itches       No past surgical history on file.    No family history on file.    Social History     Socioeconomic History   • Marital status:    Tobacco Use   • Smoking status: Every Day     Packs/day: 1.50     Types: Cigarettes   • Smokeless tobacco: Never   Substance and Sexual Activity   • Alcohol use: No   • Drug use: No   • Sexual activity: Yes     Prior to Admission medications    Medication Sig Start Date End Date Taking? Authorizing Provider   albuterol (ACCUNEB) 1.25 MG/3ML nebulizer solution Take 3 mL by nebulization Every 4 (Four) Hours As Needed for Wheezing. 10/9/19   Bill Díaz MD   clindamycin (CLEOCIN) 300 MG capsule Take 1 capsule by mouth 4 (Four) Times a Day. 6/19/22   Remi Vernon,    cyclobenzaprine (FLEXERIL) 10 MG tablet Take 1 tablet by mouth 3 (Three) Times a Day As Needed for Muscle Spasms for up to 15 doses. 3/18/23   Pebbles Mujica APRN   doxycycline (MONODOX) 100 MG capsule Take 1 capsule by mouth 2 (Two) Times a Day. 10/9/19   Bill Díaz MD   ibuprofen (ADVIL,MOTRIN) 800 MG tablet Take 1 tablet by mouth Every 8 (Eight) Hours As Needed for Moderate Pain. 3/18/23   Pebbles Mujica APRN   predniSONE (DELTASONE) 50 MG tablet Take 1 tablet by mouth Daily. 10/9/19   Bill Díaz MD     /87   Pulse 66   Temp 97.8 °F (36.6 °C)   Resp 18   Ht 180.3 cm (71\")   Wt 72.4 kg (159 lb 9.8 oz)   SpO2 98%   BMI 22.26 kg/m²         Objective   Physical " Exam  General: Well-appearing, no acute distress  Psych: Oriented, pleasant affect  Respirations:  nonlabored respirations  Skin: No rash, normal color  Extremities: There is some mild soft tissue swelling over the lateral malleolus of the left ankle and some tenderness palpation in that region, no medial tenderness, no foot tenderness or proximal tib-fib tenderness, Achilles is normal to palpation without palpable disruption or tenderness, he has normal pulses and sensorimotor function throughout the foot  Procedures           ED Course      XR Ankle 3+ View Left    Result Date: 5/21/2023  Impression: No acute fracture or traumatic malalignment identified. Electronically Signed: Marito Diaz  5/21/2023 8:36 AM EDT  Workstation ID: [id number]                                         Medical Decision Making  Patient was advised of findings.  We discussed possibility of occult injury and the need for Aircast and crutches.  We discussed supportive care measures as well as the need for follow-up if symptoms persist.    Sprain of anterior talofibular ligament of left ankle, initial encounter: acute illness or injury  Amount and/or Complexity of Data Reviewed  Radiology: ordered and independent interpretation performed.     Details: My independent interpretation of x-ray image of the left ankle no apparent fracture or dislocation          Final diagnoses:   Sprain of anterior talofibular ligament of left ankle, initial encounter       ED Disposition  ED Disposition     ED Disposition   Discharge    Condition   Stable    Comment   --             PATIENT CONNECTION - [facility] [address]  [phone]  Schedule an appointment as soon as possible for a visit in 1 week  As needed         Medication List      No changes were made to your prescriptions during this visit.          Bill Díaz MD  05/21/23 0900

## 2023-05-21 NOTE — DISCHARGE INSTRUCTIONS
Ice, elevate, Aircast and crutches.  Nonweightbearing for 3 days then advance weightbearing as tolerated.  If pain persists follow-up with your doctor in 1 week for recheck to further rule out occult injury.  Return for discoloration, numbness, weakness or any other concern

## 2023-09-10 ENCOUNTER — APPOINTMENT (OUTPATIENT)
Dept: GENERAL RADIOLOGY | Facility: HOSPITAL | Age: 38
End: 2023-09-10
Payer: MEDICAID

## 2023-09-10 ENCOUNTER — HOSPITAL ENCOUNTER (EMERGENCY)
Facility: HOSPITAL | Age: 38
Discharge: HOME | End: 2023-09-10
Attending: EMERGENCY MEDICINE | Admitting: EMERGENCY MEDICINE
Payer: MEDICAID

## 2023-09-10 VITALS
HEART RATE: 88 BPM | RESPIRATION RATE: 17 BRPM | OXYGEN SATURATION: 94 % | TEMPERATURE: 97.7 F | SYSTOLIC BLOOD PRESSURE: 125 MMHG | DIASTOLIC BLOOD PRESSURE: 85 MMHG | HEIGHT: 71 IN | WEIGHT: 155.42 LBS | BODY MASS INDEX: 21.76 KG/M2

## 2023-09-10 DIAGNOSIS — S93.402A SPRAIN OF LEFT ANKLE, UNSPECIFIED LIGAMENT, INITIAL ENCOUNTER: ICD-10-CM

## 2023-09-10 DIAGNOSIS — M25.572 ACUTE LEFT ANKLE PAIN: Primary | ICD-10-CM

## 2023-09-10 PROCEDURE — 73610 X-RAY EXAM OF ANKLE: CPT

## 2023-09-10 PROCEDURE — 99283 EMERGENCY DEPT VISIT LOW MDM: CPT

## 2023-09-10 PROCEDURE — 96372 THER/PROPH/DIAG INJ SC/IM: CPT

## 2023-09-10 PROCEDURE — 25010000002 KETOROLAC TROMETHAMINE PER 15 MG: Performed by: PHYSICIAN ASSISTANT

## 2023-09-10 RX ORDER — KETOROLAC TROMETHAMINE 30 MG/ML
30 INJECTION, SOLUTION INTRAMUSCULAR; INTRAVENOUS ONCE
Status: COMPLETED | OUTPATIENT
Start: 2023-09-10 | End: 2023-09-10

## 2023-09-10 RX ORDER — IBUPROFEN 800 MG/1
800 TABLET ORAL EVERY 8 HOURS PRN
Qty: 15 TABLET | Refills: 0 | Status: SHIPPED | OUTPATIENT
Start: 2023-09-10

## 2023-09-10 RX ORDER — NAPROXEN 500 MG/1
500 TABLET ORAL 2 TIMES DAILY WITH MEALS
Qty: 20 TABLET | Refills: 0 | Status: SHIPPED | OUTPATIENT
Start: 2023-09-10

## 2023-09-10 RX ADMIN — KETOROLAC TROMETHAMINE 30 MG: 30 INJECTION, SOLUTION INTRAMUSCULAR; INTRAVENOUS at 14:42

## 2023-09-10 NOTE — ED PROVIDER NOTES
Subjective   History of Present Illness  Patient is a 38-year-old male presents to the ED with reports of left ankle injury just prior to arrival patient describes an inversion mechanism twisting of his ankle and reports pain along the lateral aspect.  He also reports some swelling patient states the pain is worse with ambulation.  He states the pain is nonradiating from his ankle.  He rates the pain as severe.  Does report history of ankle sprains in the past.  Has never followed up with orthopedist as he has not had insurance.    History provided by:  Patient    Review of Systems   Gastrointestinal:  Negative for nausea and vomiting.   Musculoskeletal:  Positive for arthralgias and joint swelling.   Skin:  Positive for color change.   Neurological:  Negative for weakness and numbness.     Past Medical History:   Diagnosis Date    COPD (chronic obstructive pulmonary disease)        Allergies   Allergen Reactions    Codeine Itching and Other (See Comments)     Throat itches       No past surgical history on file.    No family history on file.    Social History     Socioeconomic History    Marital status:    Tobacco Use    Smoking status: Every Day     Packs/day: 1.50     Types: Cigarettes    Smokeless tobacco: Never   Substance and Sexual Activity    Alcohol use: No    Drug use: No    Sexual activity: Yes           Objective   Physical Exam  Vitals and nursing note reviewed.   Constitutional:       General: He is not in acute distress.     Appearance: Normal appearance. He is well-developed. He is not ill-appearing, toxic-appearing or diaphoretic.   HENT:      Head: Normocephalic and atraumatic.      Mouth/Throat:      Mouth: Mucous membranes are moist.      Pharynx: Oropharynx is clear.   Eyes:      Extraocular Movements: Extraocular movements intact.      Pupils: Pupils are equal, round, and reactive to light.   Cardiovascular:      Rate and Rhythm: Normal rate and regular rhythm.      Pulses: Normal  "pulses.      Heart sounds: No murmur heard.    No friction rub. No gallop.   Pulmonary:      Effort: Pulmonary effort is normal. No tachypnea, accessory muscle usage or respiratory distress.      Breath sounds: Normal breath sounds. No stridor. No decreased breath sounds, wheezing, rhonchi or rales.   Chest:      Chest wall: No mass, deformity, tenderness or crepitus.   Musculoskeletal:      Left knee: Normal.      Left lower leg: No bony tenderness.      Right ankle: Normal.      Left ankle: Swelling and ecchymosis present. No deformity. Tenderness present over the lateral malleolus. Decreased range of motion.      Left Achilles Tendon: Normal.      Left foot: Normal.      Comments: Peripheral pulses intact compartments are soft of bilateral lower extremities.  Decreased range of motion about the left ankle with flexion extension inversion eversion secondary to pain.  Pain along the lateral malleolus with palpation.  There is edema and ecchymosis noted in this region.  Negative Lindsey test.   Skin:     General: Skin is warm.      Capillary Refill: Capillary refill takes less than 2 seconds.      Findings: No rash.   Neurological:      Mental Status: He is alert and oriented to person, place, and time.   Psychiatric:         Mood and Affect: Mood normal.         Behavior: Behavior normal.       Procedures           ED Course    /85 (BP Location: Left arm, Patient Position: Sitting)   Pulse 88   Temp 97.7 °F (36.5 °C) (Oral)   Resp 16   Ht 180.3 cm (71\")   Wt 70.5 kg (155 lb 6.8 oz)   SpO2 94%   BMI 21.68 kg/m²   Medications   ketorolac (TORADOL) injection 30 mg (30 mg Intramuscular Given 9/10/23 1442)     XR Ankle 3+ View Left   Final Result   Impression:   Lateral ankle soft tissue swelling without acute osseous injury identified.         Electronically Signed: Mike Chowdhury MD     9/10/2023 1:49 PM CDT     Workstation ID: [id number]                                               Medical Decision " Making  Appropriate PPE was worn during exam and throughout all encounters with the patient.  When the ED patient was afebrile and appeared nontoxic presented with left ankle pain after playing basketball earlier today he was given Toradol and ice while in the ED.  X-ray was obtained to look for acute bony abnormality including fracture dislocation which was unremarkable for any acute osseous abnormality did show some soft tissue swelling that was noted on physical exam.  Patient was placed in an Aircast he states he has crutches at home he can use for the next week we discussed RICE therapy.  He will be given naproxen for home he will also be advised to follow-up with foot and ankle specialist for further management of his symptoms continue.  Patient was also given a work note.  Patient was understanding of discharge along with signs and symptoms to return all questions were answered at bedside.    This document is intended for medical expert use only. Reading of this document by patients and/or patient's family without participating medical staff guidance may result in misinterpretation and unintended morbidity.  Any interpretation of such data is the responsibility of the patient and/or family member responsible for the patient in concert with their primary or specialist providers, not to be left for sources of online searches such as [employer], [employer] or similar queries. Relying on these approaches to knowledge may result in misinterpretation, misguided goals of care and even death should patients or family members try recommendations outside of the realm of professional medical care in a supervised inpatient environment.       Problems Addressed:  Acute left ankle pain: acute illness or injury  Sprain of left ankle, unspecified ligament, initial encounter: acute illness or injury    Amount and/or Complexity of Data Reviewed  Radiology: ordered. Decision-making details documented in ED Course.    Risk  Prescription  drug management.        Final diagnoses:   Acute left ankle pain   Sprain of left ankle, unspecified ligament, initial encounter       ED Disposition  ED Disposition       ED Disposition   Discharge    Condition   Stable    Comment   --               [facility] EMERGENCY DEPARTMENT  [address] [address] [phone]  Go to   If symptoms worsen    PATIENT CONNECTION - [facility] [address]  [phone]  Call   If you do not have a primary care provider    Erwin Hernandez Jr., DPM  [address]  [place] [address] [phone]    Schedule an appointment as soon as possible for a visit            Medication List        New Prescriptions      naproxen 500 MG tablet  Commonly known as: NAPROSYN  Take 1 tablet by mouth 2 (Two) Times a Day With Meals.               Where to Get Your Medications        These medications were sent to [facility]/pharmacy #[id number] - [place], [place] - [address] [phone] [place] [phone]   [address] [place] [address]      Phone: [phone]   ibuprofen 800 MG tablet  naproxen 500 MG tablet            Perla Arroyo PA  09/10/23 2223

## 2023-09-10 NOTE — ED NOTES
Patient sitting quietly in bed in no apparent distress, call light within reach, ice pack in place

## 2023-09-10 NOTE — DISCHARGE INSTRUCTIONS
Use Aircast to the left ankle for the next 5-7 days; perform range of motion exercises 3-4 times daily as instructed; apply ice for 20 minutes at a time for the next 48 hours to reduce swelling; use crutches for non weight-bearing to the left foot and ankle for the next 3-5 days, gradually begin weight bearing as tolerated by pain. Return for worsening symptoms including increased pain, swelling, discoloration, or coldness of a extremity.    Take naproxen as needed for pain.  Do not mix with other NSAIDs such as ibuprofen, diclofenac, or Aleve    Follow-up with orthopedist for further management of your symptoms continue    Follow-up with your primary care provider in 3-5 days.  If you do not have a primary care provider call [phone] for help in finding one, or you may follow up with [facility] at [phone].    Return to ED for any new or worsening symptoms

## 2023-09-10 NOTE — Clinical Note
[facility] EMERGENCY DEPARTMENT  [address] [place] [address]  Phone: [phone]    Manuel Combs was seen and treated in our emergency department on 9/10/2023.  He may return to work on 09/12/2023.  Use Aircast and crutches until 9/17/2023 unless otherwise specified by primary care orthopedist       Thank you for choosing [facility].    Perla Arroyo PA

## 2025-02-04 ENCOUNTER — HOSPITAL ENCOUNTER (EMERGENCY)
Facility: HOSPITAL | Age: 40
Discharge: HOME | End: 2025-02-04
Attending: EMERGENCY MEDICINE | Admitting: EMERGENCY MEDICINE
Payer: MEDICAID

## 2025-02-04 VITALS
OXYGEN SATURATION: 99 % | DIASTOLIC BLOOD PRESSURE: 84 MMHG | HEIGHT: 71 IN | HEART RATE: 66 BPM | RESPIRATION RATE: 18 BRPM | TEMPERATURE: 98 F | BODY MASS INDEX: 21.91 KG/M2 | SYSTOLIC BLOOD PRESSURE: 144 MMHG | WEIGHT: 156.53 LBS

## 2025-02-04 DIAGNOSIS — R11.2 NAUSEA AND VOMITING, UNSPECIFIED VOMITING TYPE: ICD-10-CM

## 2025-02-04 DIAGNOSIS — J06.9 VIRAL URI: Primary | ICD-10-CM

## 2025-02-04 LAB
B PARAPERT DNA SPEC QL NAA+PROBE: NOT DETECTED
B PERT DNA SPEC QL NAA+PROBE: NOT DETECTED
C PNEUM DNA NPH QL NAA+NON-PROBE: NOT DETECTED
FLUAV SUBTYP SPEC NAA+PROBE: NOT DETECTED
FLUBV RNA ISLT QL NAA+PROBE: NOT DETECTED
HADV DNA SPEC NAA+PROBE: NOT DETECTED
HCOV 229E RNA SPEC QL NAA+PROBE: NOT DETECTED
HCOV HKU1 RNA SPEC QL NAA+PROBE: NOT DETECTED
HCOV NL63 RNA SPEC QL NAA+PROBE: NOT DETECTED
HCOV OC43 RNA SPEC QL NAA+PROBE: NOT DETECTED
HMPV RNA NPH QL NAA+NON-PROBE: NOT DETECTED
HPIV1 RNA ISLT QL NAA+PROBE: NOT DETECTED
HPIV2 RNA SPEC QL NAA+PROBE: NOT DETECTED
HPIV3 RNA NPH QL NAA+PROBE: NOT DETECTED
HPIV4 P GENE NPH QL NAA+PROBE: NOT DETECTED
M PNEUMO IGG SER IA-ACNC: NOT DETECTED
RHINOVIRUS RNA SPEC NAA+PROBE: DETECTED
RSV RNA NPH QL NAA+NON-PROBE: NOT DETECTED
SARS-COV-2 RNA RESP QL NAA+PROBE: NOT DETECTED

## 2025-02-04 PROCEDURE — 94799 UNLISTED PULMONARY SVC/PX: CPT

## 2025-02-04 PROCEDURE — 0202U NFCT DS 22 TRGT SARS-COV-2: CPT | Performed by: EMERGENCY MEDICINE

## 2025-02-04 PROCEDURE — 99283 EMERGENCY DEPT VISIT LOW MDM: CPT

## 2025-02-04 PROCEDURE — 94664 DEMO&/EVAL PT USE INHALER: CPT

## 2025-02-04 PROCEDURE — 94640 AIRWAY INHALATION TREATMENT: CPT

## 2025-02-04 RX ORDER — ONDANSETRON 4 MG/1
4 TABLET, ORALLY DISINTEGRATING ORAL EVERY 8 HOURS PRN
Qty: 12 TABLET | Refills: 0 | Status: SHIPPED | OUTPATIENT
Start: 2025-02-04

## 2025-02-04 RX ORDER — IPRATROPIUM BROMIDE AND ALBUTEROL SULFATE 2.5; .5 MG/3ML; MG/3ML
3 SOLUTION RESPIRATORY (INHALATION) ONCE
Status: COMPLETED | OUTPATIENT
Start: 2025-02-04 | End: 2025-02-04

## 2025-02-04 RX ORDER — PREDNISONE 20 MG/1
20 TABLET ORAL DAILY
Qty: 5 TABLET | Refills: 0 | Status: SHIPPED | OUTPATIENT
Start: 2025-02-04

## 2025-02-04 RX ADMIN — IPRATROPIUM BROMIDE AND ALBUTEROL SULFATE 3 ML: .5; 3 SOLUTION RESPIRATORY (INHALATION) at 07:58

## 2025-02-04 NOTE — Clinical Note
[facility] EMERGENCY DEPARTMENT  [address] [place] [address]  Phone: [phone]    Manuel Combs was seen and treated in our emergency department on 2/4/2025.  He may return to work on 02/06/2025.  Patient may return to work when fever free for 24 hours without medication.       Thank you for choosing [facility].    Manuel Kearney MD

## 2025-02-04 NOTE — ED PROVIDER NOTES
Subjective   History of Present Illness  Patient is a 39-year-old male complaint of cough congestion for the past several days.  Denies fever diarrhea or other complaint other than vomiting several days ago.      Review of Systems    Past Medical History:   Diagnosis Date    COPD (chronic obstructive pulmonary disease)        Allergies   Allergen Reactions    Codeine Itching and Other (See Comments)     Throat itches       No past surgical history on file.    No family history on file.    Social History     Socioeconomic History    Marital status:    Tobacco Use    Smoking status: Every Day     Current packs/day: 1.50     Types: Cigarettes    Smokeless tobacco: Never   Substance and Sexual Activity    Alcohol use: No    Drug use: No    Sexual activity: Yes           Objective   Physical Exam  Neck has no adenopathy.  Lungs have mild extra wheeze at the base.  Heart has regular rhythm without murmur.  Abdomen is soft nontender.  Patient with normal bowel sounds.  Back has no tenderness.  Procedures           ED Course      Results for orders placed or performed during the hospital encounter of 02/04/25   Respiratory Panel PCR w/COVID-19(SARS-CoV-2) FATOU/ASCENCION/PANKAJ/PAD/COR/SOHA In-House, NP Swab in UTM/VTM, 2 HR TAT - Swab, Nasopharynx    Collection Time: 02/04/25  6:46 AM    Specimen: Nasopharynx; Swab   Result Value Ref Range    ADENOVIRUS, PCR Not Detected Not Detected    Coronavirus 229E Not Detected Not Detected    Coronavirus HKU1 Not Detected Not Detected    Coronavirus NL63 Not Detected Not Detected    Coronavirus OC43 Not Detected Not Detected    COVID19 Not Detected Not Detected - Ref. Range    Human Metapneumovirus Not Detected Not Detected    Human Rhinovirus/Enterovirus Detected (A) Not Detected    Influenza A PCR Not Detected Not Detected    Influenza B PCR Not Detected Not Detected    Parainfluenza Virus 1 Not Detected Not Detected    Parainfluenza Virus 2 Not Detected Not Detected    Parainfluenza  Virus 3 Not Detected Not Detected    Parainfluenza Virus 4 Not Detected Not Detected    RSV, PCR Not Detected Not Detected    Bordetella pertussis pcr Not Detected Not Detected    Bordetella parapertussis PCR Not Detected Not Detected    Chlamydophila pneumoniae PCR Not Detected Not Detected    Mycoplasma pneumo by PCR Not Detected Not Detected                                                      Medical Decision Making  Respiratory panel is positive for rhinovirus.  Patient given a breathing treatment.  He will be discharged with a prescription for Zofran and prednisone.  Will see his MD for recheck    Amount and/or Complexity of Data Reviewed  Labs: ordered. Decision-making details documented in ED Course.    Risk  Prescription drug management.        Final diagnoses:   Viral URI   Nausea and vomiting, unspecified vomiting type       ED Disposition  ED Disposition       ED Disposition   Discharge    Condition   Stable    Comment   --               No follow-up provider specified.       Medication List        New Prescriptions      ondansetron ODT 4 MG disintegrating tablet  Commonly known as: ZOFRAN-ODT  Place 1 tablet on the tongue Every 8 (Eight) Hours As Needed for Vomiting.            Changed      * predniSONE 50 MG tablet  Commonly known as: DELTASONE  Take 1 tablet by mouth Daily.  What changed: Another medication with the same name was added. Make sure you understand how and when to take each.     * predniSONE 20 MG tablet  Commonly known as: DELTASONE  Take 1 tablet by mouth Daily.  What changed: You were already taking a medication with the same name, and this prescription was added. Make sure you understand how and when to take each.           * This list has 2 medication(s) that are the same as other medications prescribed for you. Read the directions carefully, and ask your doctor or other care provider to review them with you.                   Where to Get Your Medications        Information about  where to get these medications is not yet available    Ask your nurse or doctor about these medications  ondansetron ODT 4 MG disintegrating tablet  predniSONE 20 MG tablet            Manuel Kearney MD  02/04/25 1573

## 2025-02-06 ENCOUNTER — APPOINTMENT (OUTPATIENT)
Dept: GENERAL RADIOLOGY | Facility: HOSPITAL | Age: 40
End: 2025-02-06
Payer: MEDICAID

## 2025-02-06 ENCOUNTER — HOSPITAL ENCOUNTER (EMERGENCY)
Facility: HOSPITAL | Age: 40
Discharge: HOME | End: 2025-02-06
Payer: MEDICAID

## 2025-02-06 VITALS
HEIGHT: 71 IN | DIASTOLIC BLOOD PRESSURE: 91 MMHG | BODY MASS INDEX: 21.98 KG/M2 | HEART RATE: 76 BPM | WEIGHT: 156.97 LBS | SYSTOLIC BLOOD PRESSURE: 134 MMHG | TEMPERATURE: 98.5 F | OXYGEN SATURATION: 95 % | RESPIRATION RATE: 20 BRPM

## 2025-02-06 DIAGNOSIS — J18.9 ATYPICAL PNEUMONIA: ICD-10-CM

## 2025-02-06 DIAGNOSIS — J20.6 ACUTE BRONCHITIS DUE TO RHINOVIRUS: Primary | ICD-10-CM

## 2025-02-06 PROCEDURE — 94664 DEMO&/EVAL PT USE INHALER: CPT

## 2025-02-06 PROCEDURE — 96372 THER/PROPH/DIAG INJ SC/IM: CPT

## 2025-02-06 PROCEDURE — 93005 ELECTROCARDIOGRAM TRACING: CPT

## 2025-02-06 PROCEDURE — 71046 X-RAY EXAM CHEST 2 VIEWS: CPT

## 2025-02-06 PROCEDURE — 94640 AIRWAY INHALATION TREATMENT: CPT

## 2025-02-06 PROCEDURE — 94799 UNLISTED PULMONARY SVC/PX: CPT

## 2025-02-06 PROCEDURE — 99283 EMERGENCY DEPT VISIT LOW MDM: CPT

## 2025-02-06 PROCEDURE — 25010000002 METHYLPREDNISOLONE PER 125 MG: Performed by: NURSE PRACTITIONER

## 2025-02-06 RX ORDER — AZITHROMYCIN 250 MG/1
250 TABLET, FILM COATED ORAL DAILY
Qty: 4 TABLET | Refills: 0 | Status: SHIPPED | OUTPATIENT
Start: 2025-02-06 | End: 2025-02-10

## 2025-02-06 RX ORDER — ALBUTEROL SULFATE 90 UG/1
2 INHALANT RESPIRATORY (INHALATION) EVERY 6 HOURS PRN
Qty: 18 G | Refills: 0 | Status: SHIPPED | OUTPATIENT
Start: 2025-02-06

## 2025-02-06 RX ORDER — METHYLPREDNISOLONE SODIUM SUCCINATE 125 MG/2ML
80 INJECTION, POWDER, LYOPHILIZED, FOR SOLUTION INTRAMUSCULAR; INTRAVENOUS ONCE
Status: COMPLETED | OUTPATIENT
Start: 2025-02-06 | End: 2025-02-06

## 2025-02-06 RX ORDER — IPRATROPIUM BROMIDE AND ALBUTEROL SULFATE 2.5; .5 MG/3ML; MG/3ML
3 SOLUTION RESPIRATORY (INHALATION) ONCE
Status: COMPLETED | OUTPATIENT
Start: 2025-02-06 | End: 2025-02-06

## 2025-02-06 RX ORDER — AZITHROMYCIN 250 MG/1
500 TABLET, FILM COATED ORAL ONCE
Status: COMPLETED | OUTPATIENT
Start: 2025-02-06 | End: 2025-02-06

## 2025-02-06 RX ADMIN — METHYLPREDNISOLONE SODIUM SUCCINATE 80 MG: 125 INJECTION, POWDER, FOR SOLUTION INTRAMUSCULAR; INTRAVENOUS at 20:45

## 2025-02-06 RX ADMIN — AZITHROMYCIN 500 MG: 250 TABLET, FILM COATED ORAL at 21:22

## 2025-02-06 RX ADMIN — IPRATROPIUM BROMIDE AND ALBUTEROL SULFATE 3 ML: .5; 3 SOLUTION RESPIRATORY (INHALATION) at 21:00

## 2025-02-06 NOTE — Clinical Note
[facility] EMERGENCY DEPARTMENT  [address] [place] [address]  Phone: [phone]    Manuel Combs was seen and treated in our emergency department on 2/6/2025.  He may return to work on 02/09/2025.         Thank you for choosing [facility].    Deng Dalton RN

## 2025-02-07 LAB
QT INTERVAL: 409 MS
QTC INTERVAL: 415 MS

## 2025-02-07 NOTE — ED PROVIDER NOTES
Subjective   History of Present Illness  Patient is a 39-year-old gentleman that was diagnosed with rhinovirus 3 days ago and states that today he has increased wheezing and shortness of breath he states that he is still smoking.      Review of Systems   HENT:  Positive for congestion.    Respiratory:  Positive for cough and shortness of breath.        Past Medical History:   Diagnosis Date    COPD (chronic obstructive pulmonary disease)        Allergies   Allergen Reactions    Codeine Itching and Other (See Comments)     Throat itches       History reviewed. No pertinent surgical history.    History reviewed. No pertinent family history.    Social History     Socioeconomic History    Marital status:    Tobacco Use    Smoking status: Every Day     Current packs/day: 1.50     Types: Cigarettes    Smokeless tobacco: Never   Substance and Sexual Activity    Alcohol use: No    Drug use: No    Sexual activity: Yes           Objective   Physical Exam  Vitals reviewed.   Constitutional:       Appearance: He is well-developed.   HENT:      Head: Normocephalic and atraumatic.   Eyes:      Conjunctiva/sclera: Conjunctivae normal.      Pupils: Pupils are equal, round, and reactive to light.   Cardiovascular:      Rate and Rhythm: Normal rate and regular rhythm.      Heart sounds: Normal heart sounds.   Pulmonary:      Effort: Pulmonary effort is normal.      Breath sounds: Normal breath sounds. Examination of the right-upper field reveals wheezing. Examination of the left-upper field reveals wheezing. Examination of the right-lower field reveals decreased breath sounds and wheezing. Examination of the left-lower field reveals decreased breath sounds and wheezing.   Abdominal:      General: Bowel sounds are normal.      Palpations: Abdomen is soft.   Musculoskeletal:         General: Normal range of motion.      Cervical back: Normal range of motion and neck supple.      Right lower leg: No tenderness. No edema.       "Left lower leg: No tenderness. No edema.   Skin:     General: Skin is warm and dry.      Capillary Refill: Capillary refill takes less than 2 seconds.   Neurological:      General: No focal deficit present.      Mental Status: He is alert and oriented to person, place, and time.      GCS: GCS eye subscore is 4. GCS verbal subscore is 5. GCS motor subscore is 6.   Psychiatric:         Mood and Affect: Mood normal.         Behavior: Behavior normal.         Procedures           ED Course        /95   Pulse 71   Temp 98.5 °F (36.9 °C) (Oral)   Resp 18   Ht 180.3 cm (71\")   Wt 71.2 kg (156 lb 15.5 oz)   SpO2 93%   BMI 21.89 kg/m²   Labs Reviewed - No data to display  Medications   ipratropium-albuterol (DUO-NEB) nebulizer solution 3 mL (has no administration in time range)   azithromycin (ZITHROMAX) tablet 500 mg (has no administration in time range)   methylPREDNISolone sodium succinate (SOLU-Medrol) injection 80 mg (80 mg Intramuscular Given 2/6/25 2045)     XR Chest 2 View    Result Date: 2/6/2025  Impression: Diffuse groundglass opacity and mild septal thickening. Question atypical pneumonia including viral pneumonia. Pulmonary edema thought to be less likely given this appearance. Electronically Signed: Paige Kitchen MD  2/6/2025 8:42 PM EST  Workstation ID: [id number]                                                  Medical Decision Making  Patient is a 39-year-old gentleman who is a smoker who was diagnosed with rhinovirus 3 days ago and comes back in today with cough congestion worsening shortness of breath and wheezing he is a smoker.  He had above exam and it was noted on chart review that he is already on prednisone but he was given an IM dose of Solu-Medrol here.  He was given a breathing treatment as well and had improvement of his symptoms and wheezing had decreased.  Patient had above exam and x-ray showed which was read by the radiologist myself and Dr. Díaz as an atypical pneumonia versus " viral pneumonia but with the return to the emergency room I will treat him like atypical and he was given his first dose of Zithromax here.  He will be discharged home with Zithromax as well as an albuterol inhaler advised to take prednisone till gone that he received 2 days ago he verbalized understood the need to return for worsening symptoms follow-up with primary care    Problems Addressed:  Acute bronchitis due to Rhinovirus: complicated acute illness or injury  Atypical pneumonia: complicated acute illness or injury    Amount and/or Complexity of Data Reviewed  Radiology: ordered and independent interpretation performed. Decision-making details documented in ED Course.  ECG/medicine tests: ordered and independent interpretation performed. Decision-making details documented in ED Course.    Risk  Prescription drug management.        Final diagnoses:   Acute bronchitis due to Rhinovirus   Atypical pneumonia       ED Disposition  ED Disposition       ED Disposition   Discharge    Condition   Stable    Comment   --               [facility]  [phone]  Call in 2 days  If symptoms worsen, As needed         Medication List        New Prescriptions      azithromycin 250 MG tablet  Commonly known as: ZITHROMAX  Take 1 tablet by mouth Daily for 4 days.            Changed      * albuterol 1.25 MG/3ML nebulizer solution  Commonly known as: ACCUNEB  Take 3 mL by nebulization Every 4 (Four) Hours As Needed for Wheezing.  What changed: Another medication with the same name was added. Make sure you understand how and when to take each.     * albuterol sulfate  (90 Base) MCG/ACT inhaler  Commonly known as: PROVENTIL HFA;VENTOLIN HFA;PROAIR HFA  Inhale 2 puffs Every 6 (Six) Hours As Needed for Wheezing or Shortness of Air.  What changed: You were already taking a medication with the same name, and this prescription was added. Make sure you understand how and when to take each.            * This list has 2 medication(s) that are the same as other medications prescribed for you. Read the directions carefully, and ask your doctor or other care provider to review them with you.                Stop      clindamycin 300 MG capsule  Commonly known as: CLEOCIN     cyclobenzaprine 10 MG tablet  Commonly known as: FLEXERIL     doxycycline 100 MG capsule  Commonly known as: MONODOX     ibuprofen 800 MG tablet  Commonly known as: ADVIL,MOTRIN     naproxen 500 MG tablet  Commonly known as: NAPROSYN               Where to Get Your Medications        These medications were sent to [facility]/pharmacy #[id number] - [place], [place] - [address] [facility] [phone] [facility] [phone]   [address] [facility] [place] [address]      Phone: [phone]   albuterol sulfate  (90 Base) MCG/ACT inhaler  azithromycin 250 MG tablet            Meredith Virgen, APRN  02/06/25 [id number]

## 2025-02-07 NOTE — DISCHARGE INSTRUCTIONS
Take antibiotics till gone    Finish the prednisone you were given at your last visit    Use albuterol inhaler as needed for shortness of breath    Follow-up with primary care and/or return to the emergency room for worsening symptoms